# Patient Record
Sex: FEMALE | Race: WHITE
[De-identification: names, ages, dates, MRNs, and addresses within clinical notes are randomized per-mention and may not be internally consistent; named-entity substitution may affect disease eponyms.]

---

## 2017-08-31 ENCOUNTER — HOSPITAL ENCOUNTER (EMERGENCY)
Dept: HOSPITAL 46 - ED | Age: 37
Discharge: HOME | End: 2017-08-31
Payer: COMMERCIAL

## 2017-08-31 VITALS — WEIGHT: 242.99 LBS | HEIGHT: 65 IN | BODY MASS INDEX: 40.48 KG/M2

## 2017-08-31 DIAGNOSIS — Z98.51: ICD-10-CM

## 2017-08-31 DIAGNOSIS — Z79.899: ICD-10-CM

## 2017-08-31 DIAGNOSIS — R10.13: Primary | ICD-10-CM

## 2017-08-31 DIAGNOSIS — Z87.891: ICD-10-CM

## 2017-09-19 ENCOUNTER — HOSPITAL ENCOUNTER (OUTPATIENT)
Dept: HOSPITAL 46 - DS | Age: 37
LOS: 1 days | Discharge: HOME | End: 2017-09-20
Attending: SURGERY
Payer: COMMERCIAL

## 2017-09-19 VITALS — BODY MASS INDEX: 43.32 KG/M2 | HEIGHT: 65 IN | WEIGHT: 259.99 LBS

## 2017-09-19 DIAGNOSIS — Z98.890: ICD-10-CM

## 2017-09-19 DIAGNOSIS — F17.210: ICD-10-CM

## 2017-09-19 DIAGNOSIS — Z79.899: ICD-10-CM

## 2017-09-19 DIAGNOSIS — Z98.51: ICD-10-CM

## 2017-09-19 DIAGNOSIS — K81.1: Primary | ICD-10-CM

## 2017-09-19 DIAGNOSIS — E66.01: ICD-10-CM

## 2017-09-19 DIAGNOSIS — Z90.89: ICD-10-CM

## 2017-09-19 DIAGNOSIS — J45.909: ICD-10-CM

## 2017-09-19 PROCEDURE — BF12YZZ FLUOROSCOPY OF GALLBLADDER USING OTHER CONTRAST: ICD-10-PCS | Performed by: SURGERY

## 2017-09-19 PROCEDURE — G0008 ADMIN INFLUENZA VIRUS VAC: HCPCS

## 2017-09-19 PROCEDURE — 0FT44ZZ RESECTION OF GALLBLADDER, PERCUTANEOUS ENDOSCOPIC APPROACH: ICD-10-PCS | Performed by: SURGERY

## 2017-09-19 NOTE — NUR
37 YR OLD FEMALE PATIENT OF DR. CARTER ADMITTED TO [facility] FROM DAYSURGERY WITH DX
OF LAP CHOLEY/SEVERE POST OP N/V. UPON ADMIT TO MED-SURG IS ALET ORIENTED. C/O
PAIN AND NAUSEA. SCOPE SITES X 4 W/O DRAINAGE. FEW STERI-SRIPS ON.

## 2017-09-19 NOTE — NUR
PT RESTING ON LEFT SIDE.  AWAKENS EASILY.  STATES "IF I MOVE I WILL THROW UP"
RATES PAIN A 9/10 IN ABD AND STATES "I DONT WANT TO MOVE."

## 2017-09-19 NOTE — NUR
PATIENT INCONTINANT OF URINE WHILE VOMITTING. PATIENT UP TO [facility] [place], [place] TO VOID 500
MLS. CHANGED GOWN AND BED LINENS. PATIENT BACK TO BED. MEDICATED WITH 0.5 MG
IV DILAUDID FOR 9/10 ABDOMINAL PAIN. PATIENT CONTINUES TO BE NAUSEATED.

## 2017-09-19 NOTE — NUR
TALKED TO DR CARTER REGARDING ANTIEMETIC MEDICATION OPTIONS, NEW ORDERS
RECEIVED FOR x1 DOSE IV HYDROCORTISONE.

## 2017-09-19 NOTE — NUR
TALKED WITH LAURYN CANNON, ANESTHESIA WHO ORDERS 25 MG IV BENADRYL AND A
SCOPALAMINE PATCH. PATIENT CONTINUES TO VOMIT SMALL AMOUNTS. PATIENT RATES
PAIN 9/10. PATIENT AGAIN INCONTINANT OF URINE. PLACED CLEAN PAD UNDER PATIENT.

## 2017-09-19 NOTE — NUR
REPOSITIONED PATIENT TO LEFT SIDE TO HELP WITH PAIN. PATIENT RATES PAIN 9/10
TO RUQ, STILL C/O NAUSEA HOWEVER PATIENT IS NOT VOMITTING AT THIS TIME.

## 2017-09-19 NOTE — NUR
1325 REPORT FROM PACU RN. THIS RN ASSUMES CARE. PATIENT LYING IN BED MOANING
WITH EYES CLOSED. PATIENT AROUSES EASILY TO VOICE OR TOUCH. SCOPE SITES INTACT
XS 4. UMBILICAL SITE HAS SANGENEOUS DRAINAGE. WHICH THIS RN REINFORCES WITH
ABDIRAHMAN.
ORIENTED PATIENT TO ROOM AND CALL LIGHT. VITAL SIGNS TAKEN. PATIENT SLEEPS.

## 2017-09-19 NOTE — NUR
PT ASSESSMENT COMPLETE. PT RESTING IN BED. PT DENIES ANY N/V, SOB AT THIS
TIME. PT RATES PAIN 7/10, DENIES NEED FOR PRN PAIN MEDICATION AT THIS TIME. PT
STATES SHE IS "ABLE TO EAT SOME JELLO WITHOUT GETTING NAUSEOUS". IV FLUIDS
INFUSING WITHOUT DIFFICULTY. ABDOMINAL INCISION SITES x3 ARE OPEN TO AIR,
SCANT AMOUNT OF DRAINAGE NOTED, WELL APPROXIMATED. x1 SITE HAS STERI STRIPS
WITH DRIED DRAINAGE NOTED, STERI STRIPS BARELY INTACT WITH SKIN. EDUCATED PT
ON ALLOWING STERI STRIPS TO FALL OFF ON THEIR OWN AND NOT TO PULL THEM OFF THE
WOUND, PT VERBALIZED UNDERSTANDING. PT C/O BEING HUNGRY, WATER AND JELLO
GIVEN. CALL LIGHT WITHIN REACH. PT DENIES ANY FURTHER NEEDS AT THIS TIME.

## 2017-09-19 NOTE — NUR
Patient does not meet criteria for discharge. Dr Powers called and updated.
Patient to be admitted observation for post op nausea/vomitting.
Called and left voice mail with patients ,

## 2017-09-19 NOTE — NUR
MD CALLED AS PATIENT IS VOMITTING AND PAINFUL. NEW ORDERS RECIEVED. MEDICATED
PATIENT WITH 12.5 MG IV PHENERGAN.

## 2017-09-20 NOTE — NUR
patient agreeable to ambulate in a little while.  patient ate 50% of her
breakfast and tolerated it without any trouble.

## 2017-09-20 NOTE — NUR
PT HAD AN UNEVENTFUL NIGHT, SLEPT MOST OF NIGHT. PT AMBULATES INDEPENDENTLY IN
ROOM. PT DID NOT RECEIVE ANY PRN PAIN MEDICATION THIS SHIFT. PT DENIED ANY
N/V THIS SHIFT, TOLERATING CLEAR LIQUID DIET WELL. PT IV INFILTRATED, NO IV
ACCESS AT THIS TIME. ABDOMINAL LAP SITES x4 INTACT, WELL APPROXIMATED, OPEN TO
AIR. PT VOIDING WITHOUT DIFFICULTY. PT ON ROOM AIR. PT PLAN IS TO D/C HOME
TODAY.

## 2017-09-20 NOTE — NUR
PATIENT SITTING UP IN BED SLOWLY EATING BREAKFAST. HANDS AND FACE WASHED.
PATIENT REFUSED TO SIT UP TO CHAIR. WOULD LIKE TO BRUSH TEETH AFTER BREAKFAST.
CALL BUTTON IN REACH. NO OTHER NEEDS AT THIS TIME.

## 2017-09-20 NOTE — NUR
PATIENT SITTING UP TO CHAIR BATH AND ORAL CARE DONE. LINENS CHANGED. FRESH ICE
WATER GIVEN. CALL BUTTON IN REACH. NO OTHER NEEDS AT THIS TIME.

## 2017-09-20 NOTE — NUR
PT LAYING IN BED, GREETED ME. SHE SAID SHE IS FEELING MUCH BETTER AND THOUGHT
SHE WAS GOING HOME TODAY. PT REQUESTED PRAYER AND THANKED ME FOR THE VISIT

## 2017-09-20 NOTE — NUR
PATIENT REMAINS UP IN THE CHAIR, PATIENT ATE 90% OF HER LUNCH AND STILL HAS NO
C/O NAUSEA.  PATIENT IS NOT PAINFUL AND DOING WELL.

## 2017-09-20 NOTE — NUR
PT SLEEPING, RR EVEN AND UNLABORED. PT APPEARS COMFORTABLE AT THIS TIME. IV
FLUIDS INFUSING WITHOUT DIFFICULTY. CALL LIGHT WITHIN REACH.

## 2017-09-20 NOTE — NUR
report received from Rozina ALARCON at this time, abdominal pain is at a 6/10
patient given 1 percocet po.

## 2017-09-20 NOTE — NUR
CALLED DR CARTER REGARDING IV AND CHANGE IN DIET PER PT REQUEST. RECEIVED NEW
ORDERS FOR OKAY TO LEAVE IV OUT AND ADVANCE TO REGULAR DIET AS TOLERATED.

## 2017-09-20 NOTE — NUR
PT UP AMBULATING IN ROOM INDEPENDENTLY, VOIDING WITHOUT DIFFICULTY. PT DENIES
ANY N/V, TOLERATING CLEAR LIQUID DIET WELL, C/O WANTING ADVANCED DIET.
EDUCATED PT ON MD ORDERS AND NOT BEING ABLE TO ADVANCE DIET UNTIL MD IS CALLED
IN THE AM, PT NOT HAPPY BUT VERBALIZES UNDERSTANDING. JELLO GIVEN. PT IV
INFILTRATED, IV DC'D WITH CATHETER TIP INTACT, PRESSURE DRESSING APPLIED. LEFT
ARM WITH IV SITE WRAPPED IN WARM BLANKETS. PT DENIES ANY NEED FOR PAIN
MEDICATION. CALL LIGHT WITHIN REACH. PT DENIES ANY FURTHER NEEDS AT THIS TIME.

## 2017-09-20 NOTE — NUR
PATIENT SITTING UP IN THE CHAIR, ORAL CARE DONE BY THE CNA AT THIS TIME, APPLE
JUICE GIVEN TO THE PATIENT AT THIS TIME AND LUNCH ORDERED.

## 2017-09-20 NOTE — NUR
CONSENT SIGNED FOR FLU VACCINE AT THIS TIME, PATIENT'S PAIN IS STILL AT A
6/10.  PATIENT DENIES ANY NAUSEA AND VOMITING AND TOLERATED A REGULAR
BREAKFAST WELL.  ABDOMINAL SCOPE SITES ARE ALL INTACT WITHOUT ANY DRESSINGS.
PATIENT DENIES PASSING GAS OR BURPING AT THIS TIME.

## 2017-09-23 NOTE — OR
[facility]
                                    [address], [place]  [address]
_________________________________________________________________________________________
                                                                 Signed   
 
 
DATE OF PROCEDURE:  09/19/17 
 
PREOPERATIVE DIAGNOSES 
Chronic acalculous cholecystitis (0 ejection fraction CCK-HIDA test). 
Morbid obesity.
 
POSTOPERATIVE DIAGNOSES 
Chronic acalculous cholecystitis (0 ejection fraction CCK-HIDA test). 
Morbid obesity.
Gallbladder with profound cholesterolosis and chronic inflammation.
 
PROCEDURE 
Laparoscopic cholecystectomy with intraoperative cholangiogram.
Surgeon directed fluoroscopy.
 
SURGEON:  Magaly Carter MD.
 
ANESTHESIA 
General endotracheal (Kelley Pierre CRNA) and local 20 mL of 0.25% Marcaine with
epinephrine local.
 
INDICATION 
This morbidly obese white woman is a patient of Dr. Surinder Benitez and known to me from
the past having undergone total thyroidectomy. She additionally has eosinophilic
esophagitis and reflux disease. She has been having increasing symptoms of right upper
abdominal pain highly suggestive of biliary colic and chronic cholecystitis. A
gallbladder ultrasound was found to be normal. A CCK-HIDA test was recently performed
showing essentially no ejection fraction from the gallbladder and with reproduction of
her right subcostal and right posterior thoracic pain problems. She is admitted at this
time to undergo cholecystectomy on the probability this represents acalculous
cholecystitis. The risks of bleeding, infection, bile leak, need for open procedure, and
of course, failure to cure her symptoms were all reviewed in detail. She understands
wished to proceed.
 
FINDINGS 
She did have a somewhat fatty liver concordant to her overall body habitus. Gallbladder
itself was chronically inflamed. The cystic duct was relatively long. The gallbladder
once opened showed profound cholesterolosis and chronic inflammation. The cholangiogram
was normal. There were no other findings of concern.
 
DESCRIPTION OF PROCEDURE 
The patient was brought to the operating room, given a general endotracheal anesthetic.
 
    Electronically Signed By: MAGALY CARTER MD  09/23/17 1036
_________________________________________________________________________________________
PATIENT NAME:     JONN CHAPPELL                     
MEDICAL RECORD #: [id number]                     OPERATIVE REPORT              
          ACCT #: [id number]  
DATE OF BIRTH:   [birth year]                                       
PHYSICIAN:   MAGALY CARTER MD                      REPORT #: [id number]
REPORT IS CONFIDENTIAL AND NOT TO BE RELEASED WITHOUT AUTHORIZATION
 
 
                                  [facility]
                                    [address], [place]  [address]
_________________________________________________________________________________________
                                                                 Signed   
 
 
Preoperative antibiotic Ancef was given and sequential compression device stockings and
heparin subcutaneously administered was undertaken. The abdomen was prepared with a
Chlorhexidine solution and draped sterilely. Despite her large abdominal wall pannus, an
infraumbilical incision was made, and using an open Alondra cannula technique,
pneumoperitoneum was achieved to a level of 14 mmHg of carbon dioxide gas.
Intraabdominal inspection showed no sign of ascites or carcinomatosis. There were some
omental adhesions in the region of the umbilicus. The gallbladder was found to have a
fatty blunted liver edge. Gallbladder was chronically inflamed. Three additional trocars
were placed in usual configuration in the subxiphoid, right midclavicular, and right
anterior axillary line. The gallbladder was elevated cephalad and retracted laterally
and using blunt electrocautery dissection, the triangle of Calot was dissected free. A
dominant cystic arterial branch was doubly clipped and later divided. A clip was applied
across gallbladder cystic duct junction and a transverse choledochotomy made in the
cystic duct. Using an Ibarra type cholangiocatheter, intraoperative cholangiography was
undertaken showing free flow of contrast in the biliary tree with prompt emptying into
the duodenum. Retrograde flow to the proximal hepatic duct was normal. The cystic duct
was relatively long. The cystic duct was triply clipped after removal of the catheter
and the gallbladder dissected free in a retrograde fashion using electrocautery.
Gallbladder was extracted through the epigastric port without problem. Opened on the
back table and found to have profound cholesterolosis and chronic inflammation.
Reinspection of subhepatic space showed no sign of bile leak, bleeding or other
problems. Excess irrigation fluid was suctioned free. The trocars were removed under
direct visualization showing no sign of bleeding. The infraumbilical fascial incision
was carefully reapproximated with interrupted 0 Vicryl suture. A 20 mL 0.25% Marcaine
with epinephrine was injected locally. The skin was closed with interrupted 3-0 Vicryl.
Steri -Strips were applied. It is anticipated that the patient will be extubated in the
operating room, to be transferred to the recovery room in good condition.
 
BLOOD LOSS:  Minimal.
 
_____________________________
MD JUSTINE Beckford/Odilon
DD: 09/19/2017 11:58:06
DT: 09/19/2017 12:44:38
Job #: [id number]
 
cc:  Art Benitez MD
 
 
 
 
    Electronically Signed By: MAGALY CARTER MD  09/23/17 1036
_________________________________________________________________________________________
PATIENT NAME:     JONN CHAPPELL                     
MEDICAL RECORD #: [id number]                     OPERATIVE REPORT              
          ACCT #: [id number]  
DATE OF BIRTH:   [birth year]                                       
PHYSICIAN:   MAGALY CARTER MD                      REPORT #: [id number]
REPORT IS CONFIDENTIAL AND NOT TO BE RELEASED WITHOUT AUTHORIZATION

## 2017-10-07 ENCOUNTER — HOSPITAL ENCOUNTER (EMERGENCY)
Dept: HOSPITAL 46 - ED | Age: 37
LOS: 1 days | Discharge: HOME | End: 2017-10-08
Payer: COMMERCIAL

## 2017-10-07 VITALS — BODY MASS INDEX: 43.32 KG/M2 | HEIGHT: 65 IN | WEIGHT: 260 LBS

## 2017-10-07 DIAGNOSIS — L76.34: Primary | ICD-10-CM

## 2017-10-07 DIAGNOSIS — Z87.891: ICD-10-CM

## 2017-10-07 DIAGNOSIS — Z79.899: ICD-10-CM

## 2017-10-07 DIAGNOSIS — Z98.51: ICD-10-CM

## 2018-06-19 ENCOUNTER — HOSPITAL ENCOUNTER (EMERGENCY)
Dept: HOSPITAL 46 - ED | Age: 38
Discharge: HOME | End: 2018-06-19
Payer: COMMERCIAL

## 2018-06-19 VITALS — BODY MASS INDEX: 43.32 KG/M2 | HEIGHT: 65 IN | WEIGHT: 259.99 LBS

## 2018-06-19 DIAGNOSIS — Z79.899: ICD-10-CM

## 2018-06-19 DIAGNOSIS — Z98.890: ICD-10-CM

## 2018-06-19 DIAGNOSIS — L03.115: ICD-10-CM

## 2018-06-19 DIAGNOSIS — T81.4XXA: Primary | ICD-10-CM

## 2018-06-20 ENCOUNTER — HOSPITAL ENCOUNTER (EMERGENCY)
Dept: HOSPITAL 46 - ED | Age: 38
Discharge: HOME | End: 2018-06-20
Payer: COMMERCIAL

## 2018-06-20 DIAGNOSIS — Z79.899: ICD-10-CM

## 2018-06-20 DIAGNOSIS — L03.115: Primary | ICD-10-CM

## 2018-06-20 DIAGNOSIS — Z79.2: ICD-10-CM

## 2018-07-11 ENCOUNTER — HOSPITAL ENCOUNTER (EMERGENCY)
Dept: HOSPITAL 46 - ED | Age: 38
Discharge: HOME | End: 2018-07-11
Payer: COMMERCIAL

## 2018-07-11 VITALS — BODY MASS INDEX: 43.32 KG/M2 | WEIGHT: 259.99 LBS | HEIGHT: 65 IN

## 2018-07-11 DIAGNOSIS — Z79.899: ICD-10-CM

## 2018-07-11 DIAGNOSIS — L03.115: Primary | ICD-10-CM

## 2019-06-06 ENCOUNTER — HOSPITAL ENCOUNTER (EMERGENCY)
Dept: HOSPITAL 46 - ED | Age: 39
Discharge: HOME | End: 2019-06-06
Payer: COMMERCIAL

## 2019-06-06 VITALS — WEIGHT: 273 LBS | BODY MASS INDEX: 45.48 KG/M2 | HEIGHT: 65 IN

## 2019-06-06 DIAGNOSIS — Z90.89: ICD-10-CM

## 2019-06-06 DIAGNOSIS — Z79.899: ICD-10-CM

## 2019-06-06 DIAGNOSIS — M94.0: Primary | ICD-10-CM

## 2019-06-06 DIAGNOSIS — Z87.891: ICD-10-CM

## 2019-06-06 DIAGNOSIS — E89.0: ICD-10-CM

## 2019-06-06 DIAGNOSIS — J40: ICD-10-CM

## 2019-06-08 NOTE — EKG
[facility]
                                    [address] [facility]
                                  [place]  [address]
_________________________________________________________________________________________
                                                                 Signed   
 
 
Sinus rhythm with premature ventricular complexes or fusion complexes
Low voltage QRS
Cannot rule out Inferior infarct , age undetermined
Cannot rule out Anterior infarct , age undetermined
Abnormal ECG
No previous ECGs available
Confirmed by GUILLE UMANZOR MD ([id number]) on 6/8/2019 9:31:41 PM
 
 
 
 
 
 
 
 
 
 
 
 
 
 
 
 
 
 
 
 
 
 
 
 
 
 
 
 
 
 
 
 
 
 
 
 
 
 
    Electronically Signed By: GUILLE UMANZOR MD  06/08/19 2131
_________________________________________________________________________________________
PATIENT NAME:     JONN CHAPPELL ROSE                     
MEDICAL RECORD #: [id number]                     Electrocardiogram             
          ACCT #: [id number]  
DATE OF BIRTH:   [birth year]                                       
PHYSICIAN:   GUILLE UMANZOR MD           REPORT #: [id number]
REPORT IS CONFIDENTIAL AND NOT TO BE RELEASED WITHOUT AUTHORIZATION

## 2019-07-01 ENCOUNTER — HOSPITAL ENCOUNTER (EMERGENCY)
Dept: HOSPITAL 46 - ED | Age: 39
LOS: 1 days | Discharge: HOME | End: 2019-07-02
Payer: COMMERCIAL

## 2019-07-01 VITALS — HEIGHT: 65 IN | BODY MASS INDEX: 28.66 KG/M2 | WEIGHT: 172 LBS

## 2019-07-01 DIAGNOSIS — H10.89: Primary | ICD-10-CM

## 2019-07-01 DIAGNOSIS — Z90.89: ICD-10-CM

## 2019-07-01 DIAGNOSIS — Z79.899: ICD-10-CM

## 2019-07-01 DIAGNOSIS — Z87.891: ICD-10-CM

## 2019-07-01 NOTE — XMS
PreManage Notification: JONN CHAPPELL MRN:[id number]
 
Security Information
 
Security Events
No recent Security Events currently on file
 
 
 
CRITERIA MET
------------
- [facility] - 2 Visits in 30 Days
 
 
CARE PROVIDERS
-------------------------------------------------------------------------------------
ERNA SAMS     Nurse Practitioner: Family     Current
 
PHONE: [phone]
-------------------------------------------------------------------------------------
Don Zhang     Current
MD
 
PHONE: Unknown
-------------------------------------------------------------------------------------
Giulia Medina     Current
 
PHONE: Unknown
-------------------------------------------------------------------------------------
 
[facility] has no Care Guidelines for this patient.
 
EVA VISIT COUNT (12 MO.)
-------------------------------------------------------------------------------------
3 [facility]
-------------------------------------------------------------------------------------
TOTAL 3
-------------------------------------------------------------------------------------
NOTE: Visits indicate total known visits.
 
ED/UCC VISIT TRACKING (12 MO.)
-------------------------------------------------------------------------------------
07/01/2019 22:32
[facility] OR
 
TYPE: Emergency
 
COMPLAINT:
- RIGHT EYE INJURY
-------------------------------------------------------------------------------------
06/06/2019 19:21
[facility] [place]
 
TYPE: Emergency
 
COMPLAINT:
- CHEST PAIN/SOB
 
DIAGNOSES:
 
- Cough
- Personal history of nicotine dependence
- Acquired absence of other organs
- Bronchitis, not specified as acute or chronic
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
- Other long term (current) drug therapy
- Postprocedural hypothyroidism
-------------------------------------------------------------------------------------
07/11/2018 21:27
[facility] [place]
 
TYPE: Emergency
 
COMPLAINT:
- POSS INFECTION
 
DIAGNOSES:
- Cellulitis of right lower limb
- Other long term (current) drug therapy
-------------------------------------------------------------------------------------
 
 
INPATIENT VISIT TRACKING (12 MO.)
No inpatient visits to display in this time frame
 
[email or web address]

## 2019-12-12 NOTE — OR
[facility]
                                    [address], [place]  [address]
_________________________________________________________________________________________
                                                                 Signed   
 
 
DATE OF OPERATION:
2019
 
SURGEON:
Tee Wan MD
 
PREOPERATIVE DIAGNOSES:
1. Change in bowel habits with diarrhea for one year.
2. Father  of colon cancer at age 52.
3. Paternal grandmother  of colon cancer at age 62.
 
POSTOPERATIVE DIAGNOSIS:
Minimal internal hemorrhoids.
 
PROCEDURE:
Colonoscopy with random cold biopsies.
 
ESTIMATED BLOOD LOSS:
None.
 
INDICATIONS:
Jonn is a 39-year-old female, asked to see me for a colonoscopy.  She had a change in
bowel habits with diarrhea over the last year.  She is not sure really what makes it
better or worse.  She said it came along before her total thyroidectomy.  She also
explained that her father  at age 52 of colon cancer.  Her paternal grandmother 
at age 62 of colon cancer.  There is no inflammatory bowel disease in the family.  A CT
scan of abdomen and pelvis showed a small umbilical hernia, maybe from her previous
gallbladder surgery.  However, there were no other issues particularly with the small or
large bowel.  I was asked to see her for colonoscopy and random biopsies.  I met with
Jonn in the office and I gave her a pamphlet on colonoscopy.  We reviewed the nature
of the test along with the risks including, but not limited to gas, bloating, crampy
abdominal pain, bleeding, perforation requiring surgery, and missed diagnosis.  She also
understands the need for IV conscious sedation.  She had expressed understanding and
wished to proceed. 
 
PROCEDURE NOTE:
Jonn was taken into our endoscopy suite and placed in the left lateral decubitus
position.  She was given IV sedation with 7 mg of Versed and 125 mcg of fentanyl.  A
digital rectal exam was performed and this was unremarkable.  She had no external
hemorrhoids and good sphincter tone.  The adult colonoscope was introduced and advanced
all around into the cecum under direct visualization of camera without difficulty.  Her
prep was quite excellent.  We could easily see the appendiceal orifice and the ileocecal
 
    Electronically Signed By: TEE WAN MD  19 0958
_________________________________________________________________________________________
PATIENT NAME:     JONN CHAPPELL ROSE                     
MEDICAL RECORD #: [id number]            OPERATIVE REPORT              
          ACCT #: [id number]  
DATE OF BIRTH:   [birth year]            REPORT #: [id number]      
PHYSICIAN:        TEE WAN MD             
PCP:              GILMAR EL MD           
REPORT IS CONFIDENTIAL AND NOT TO BE RELEASED WITHOUT AUTHORIZATION
 
 
                                  [facility]
                                    [address], [place]  [address]
_________________________________________________________________________________________
                                                                 Signed   
 
 
valve.  We made several attempts to pass the scope into the terminal ileum without
success.  The scope was then slowly withdrawn.  We saw no evidence of any inflammatory
changes throughout her entire colon.  We went ahead and took several random biopsies
throughout the colon and rectum.  Once in the rectum, the scope had been retroflexed and
she has very minimal internal hemorrhoid columns.  After this, the gas was suctioned out
and colonoscope removed.  Jonn tolerated procedure quite well. 
 
RECOMMENDATIONS:
Jonn will follow up in my office in 7 to 14 days to review her results.
 
 
 
            ________________________________________
            Tee Wan MD 
 
 
ALB/ALEAHL
Job #:  [id number]
DD:  2019 09:27:34
DT:  2019 09:46:39
 
cc:            MD Gilmar Newby MD
 
 
Copies:  TEE WAN MD, ROBERT D DMD
~
 
 
 
 
 
 
 
 
 
 
 
 
 
 
    Electronically Signed By: TEE WAN MD  19 0958
_________________________________________________________________________________________
PATIENT NAME:     JONN CHAPPELL [facility]                     
MEDICAL RECORD #: [id number]            OPERATIVE REPORT              
          ACCT #: [id number]  
DATE OF BIRTH:   [birth year]            REPORT #: [id number]      
PHYSICIAN:        TEE WAN MD             
PCP:              GILMAR EL MD           
REPORT IS CONFIDENTIAL AND NOT TO BE RELEASED WITHOUT AUTHORIZATION

## 2019-12-12 NOTE — NUR
12/12/19 0936 Darrell,Karyn
0958 PT ARRIVED TO PACU WAKES TO VERBAL STIMULI AND DENIES PAIN AND
NAUSEA. VSS. PT ENCOURAGED TO PASS GAS. RESP EVEN AND UNLABORED.
 
0929 O2 REMOVED, PT ITCHING NOSE. PLAN OF CARE DISCUSSED. PT ASLEEP
OFF AND ON.

## 2019-12-13 NOTE — PATH
[facility]
                                    [address] [facility] [place]  [address]
_________________________________________________________________________________________
                                                                 Signed   
 
 
 
SPECIMEN(S): A RANDOM COLON
 
SPECIMEN SOURCE:
A. RANDOM COLON
 
CLINICAL HISTORY:
Diarrhea, family history of colon CA. Post: Min int hemorrhoids.
MICROSCOPIC DESCRIPTION:
Histologic sections of all submitted blocks are examined by light microscopy. 
These findings, together with the gross examination, support the pathologic 
diagnosis. 
 
FINAL PATHOLOGIC DIAGNOSIS:
Colon, random, biopsy:
-  Fragments of colonic mucosa with no histopathologic abnormality.
-  Negative for dysplasia or malignancy.
 
COMMENT:
Sections demonstrate fragments of colonic mucosa with no activity including 
cryptitis and crypt abscess formation, and no signs of chronicity including 
basal lymphoplasmacytosis or crypt architectural 
distortion.  There are no granulomata, viral cytopathic changes, subepithelial 
collagen band thickening or increased intraepithelial lymphocytes.  No 
infectious organisms are seen on HE stain. 
Clinical correlation required.
NAL:cml:C2NR
 
GROSS DESCRIPTION:
The specimen, labeled "JE, random colon biopsy," is received in formalin and 
consists of four, 0.2-0.3 cm tan-pink tissue fragments. Specimen is entirely 
submitted in cassette (A1). 
AM (under the direct supervision of a pathologist)
The Gross Description was prepared using a voice recognition system.  The 
report was reviewed for accuracy; however, sound-alike word errors, addition 
and/or deletions may occur.  If there is any 
question about this report, please contact Client Services.
 
PERFORMING LABORATORY:
The technical component was performed by [facility], [address] [place] [id number] (Medical Director: Bonnie Bryan MD; CLIA# [id number]). 
Professional interpretation was performed by 
 
                                                                                    
_________________________________________________________________________________________
PATIENT NAME:     JONN CHAPPELL ROSE                     
MEDICAL RECORD #: [id number]            PATHOLOGY                     
          ACCT #: [id number]       ACCESSION #: [id number]     
DATE OF BIRTH:   [birth year]            REPORT #: [id number]       
PHYSICIAN:        [facility]              
PCP:              GILMAR EL MD           
REPORT IS CONFIDENTIAL AND NOT TO BE RELEASED WITHOUT AUTHORIZATION
 
 
                                  [facility]
                                    [address], [place]  [address]
_________________________________________________________________________________________
                                                                 Signed   
 
 
[facility], [facility], [address] [address] (Medical Director:  Darío Zhang MD; CLIA# 
[id number]). 
 
Diagnostician:  Carina He MD
Pathologist
Electronically Signed 12/13/2019
 
 
Copies:                                
~
 
 
 
 
 
 
 
 
 
 
 
 
 
 
 
 
 
 
 
 
 
 
 
 
 
 
 
 
 
 
 
 
                                                                                    
_________________________________________________________________________________________
PATIENT NAME:     JONN CHAPPELL ROSE                     
MEDICAL RECORD #: [id number]            PATHOLOGY                     
          ACCT #: [id number]       ACCESSION #: [id number]     
DATE OF BIRTH:   [birth year]            REPORT #: [id number]       
PHYSICIAN:        [facility]              
PCP:              GILMAR EL MD           
REPORT IS CONFIDENTIAL AND NOT TO BE RELEASED WITHOUT AUTHORIZATION

## 2021-05-27 NOTE — EKG
[facility]
                                    [address] [facility]
                                  [place]  [address]
_________________________________________________________________________________________
                                                                 Signed   
 
 
Normal sinus rhythm
Cannot rule out Anterior infarct (cited on or before 06-JUN-2019)
Abnormal ECG
When compared with ECG of 06-JUN-2019 19:27,
fusion complexes are no longer present
premature ventricular complexes are no longer present
Minimal criteria for Inferior infarct are no longer present
Nonspecific T wave abnormality, improved in Anterolateral leads
Confirmed by ZACKERY ZHANG DO ([id number]) on 5/27/2021 12:35:28 PM
 
 
 
 
 
 
 
 
 
 
 
 
 
 
 
 
 
 
 
 
 
 
 
 
 
 
 
 
 
 
 
 
 
 
 
 
    Electronically Signed By: ZACKERY ZHANG DO  05/27/21 1235
_________________________________________________________________________________________
PATIENT NAME:     JONN CHAPPELL ROSE                     
MEDICAL RECORD #: [id number]                     Electrocardiogram             
          ACCT #: [id number]  
DATE OF BIRTH:   [birth year]                                       
PHYSICIAN:   ZACKERY ZHANG DO                     REPORT #: [id number]
REPORT IS CONFIDENTIAL AND NOT TO BE RELEASED WITHOUT AUTHORIZATION

## 2021-06-10 NOTE — NUR
1330 PT COMPLAINS OF PAIN 8/10 TRYING TO GET NAUSEA UNDER CONTROL BEFORE I CAN
GIVE PAIN MEDS SHE TOOK A SIP OF WATER AND REPORTS SHE IS UNABLE TO SWALLOW IT
BECAUSE SHE FEELS LIKE SHE WILL THROW UP, HE  IS AT BEDSIDE. REGLAN WAS
GIVEN FOR NAUSEA.

## 2021-06-10 NOTE — NUR
06/10/21 1052 Darrell,Karyn
1040 PT ARRIVED TO PACU ON 10L, VSS. O2 DECREASED TO 6L VIA MASK. JAW
THRUST USED OFF AND ON TO MAINTAIN AIRWAY. ORAL AIRWAY IN PLACE.
 
1047 PT STARTED MOVING AND DEEP BREATHING NOTED. ORAL AIRWAY REMIANS
IN PLACE.
 
1051 PT CONTINUES TO WAKE OFF AND ON, REACTIVE TO TACTILE STIMULI.

## 2021-06-10 NOTE — NUR
1230 PT BACK TO ROOM FROM PACU AWAKE ALERT, COMPLAINS OF PAIN BUT IS UNABLE TO
RATE PAIN, SHE VOMITED IN PACU SO I WANT TO GIVE SOME TIME BEFORE I GIVE HER
ORAL PAIN MEDS. SHE DENIES NAUSEA AT THIS TIME.I MADE A WARM PACK TO PUT ON
HER ABD, SHE REPORTS IT FEELS GOOD. FAIRCHILD CATH IN PLACE AND DRAINING WELL.

## 2021-06-10 NOTE — NUR
1715 CALLED DR GARCÍA GAVE REPORT ABOUT INS AND OUTS HE ADVISED US TO KEEP
LONGER UNTIL SHE CAN VOID MORE THAN 100ML. SHE WILL BE TAKEN TO MS FLOOR.

## 2021-06-10 NOTE — NUR
1445 150ML OF IV FLUID GIVEN IN DAYSURGERY WITH 200ML OF URINE IN FAIRCHILD BAG
SINCE SHE ARRIVED TO DAYSURGERY.

## 2021-06-10 NOTE — NUR
PT. ARRIVED VIA STRETCHER WITH RN. REPORT RECEIVED AT BEDSIDE. PT. IS ALERT
AND ORIENTED. SHE DENIES NAUSEA AND STATES
ABD. PAIN IS TOLERABLE. ORDERED SOUP FOR
DINNER. ABD. LAP. SITE DRESSINGS ARE CDI. LARGE AREA OF UNEVEN REDNESS ON
LOWER ABDOMEN. PT. DENIES THAT IT IS PAINFUL OR ITCHING. WILL CONTINUE TO
MONITOR. PT. IS AFEBRILE BUT REPORTS BEING DIAPHORETIC.  SHE WAS GIVEN A FAN.
SHE DOES NOT WANT TO GET UP TO VOID AT THIS TIME AND THIS NURSE TOLD HER SHE
SHOULD GET UP TO THE TOILET WITHIN HALF HOUR AND SHE IS AGREEABLE. LEFT
RESTING IN BED WITH CALL LIGHT IN REACH.

## 2021-06-10 NOTE — NUR
1515 PT REPORTS NAUSEA IS MUCH BETTER, SHE IS EATING CRACKERS AND APPLE SAUCE
TOLERATES WELL. SHE REPORTS PAIN IS 7/10 AND REQUEST PAIN MEDS.

## 2021-06-10 NOTE — NUR
PT. AMBULATED INDENDENTLY TO THE BATHROOM BUT COULD NOT VOID. BLADDER SCANNED
AND 45ML IN BLADDER. PT. REPORTS RUQ PAIN THAT IS SHARP AT TIMES IN RIBS. ABD.
SOFT AND BOWEL TONES ACTIVE. WILL CONTINUE TO MONITOR.

## 2021-06-10 NOTE — NUR
1450 PT REPORTS NAUSEA IS BETTER BUT SHE IS VERY TIRED, HER  LEFT FOR A
WHILE TO CHECK ON THEIR KIDS, PT IS RESTING COMFORTABLEY NOW.

## 2021-06-10 NOTE — NUR
DR GARCÍA CALLED AND GIVEN UPDATE ON RECENT 200MLS VOID, PER DR GARCÍA,
OKAY TO CONTINUE WITH DISCHARGE. pt ALREADY GIVEN WRITTEN AND VERBALIZED
DISCHARGE INSTRUCTIONS IN DAYSURGERY, THIS RN REVIEWED WRITTEN DISCHARGE
INSTRUCTIONS WITH pt. pt VERBALIZED UNDERSTANDING AND DENIES ADDITIONAL
QUESTIONS. PRESCRIPTIONS PICKED UP BY , VSS. LAP SITES WNL, STERI
STRIPS WITH BANDAIDS IN PLACE. SCANT SEROSANGUINEOUS SHADOWING TO STERI
STRIPS. FAINT
REDDNESS NOTED NEAR/AROUND PANNUS AREA. pt
DENIES NAUSEA, CHEST PAIN, SOB. REPORTS TOLERABLE 5/10 PAIN, pt AWARE OF LAST
TIME
PAIN MEDICATION WAS GIVEN AND WRITTEN ON DISCHARGE INSTRUCTIONS EARLIER BY
DAYSURGERY. pt ESCORTED VIA WHEELCHAIR BY CNA SAMANTHA [facility] , TO BE DISCHARGED HOME.

## 2021-06-10 NOTE — NUR
1255 CHECKED IN ON PT SHE IS SLEEPING COMPFORTABLY, O2 MONITOR LEFT ON, HER
SATS HAVE BEEN  ON ROOM AIR.

## 2021-06-10 NOTE — NUR
SHIFT REPORT RECEIVED FROM DAYSHIFT AGNES ADAM AT BEDSIDE, pt AWAKE AND RESTING IN
BED. REPORTS RECENTLY VOIDING, 200MLS OUTPUT NOTED. pt DENIES NEEDS, CALL
LIGHT IN REACH. BOARD UPDATED.

## 2021-06-10 NOTE — NUR
1400 SPOKE WITH MAGALY HILL FOR INAPSINE ORDER AS PT IS STILL COMPLAINING OF
NAUSEA. 1.25MG OF INAPSINE GIVEN

## 2021-06-11 NOTE — PATH
[facility]
                                    [address] [place], [place]  [address]
_________________________________________________________________________________________
                                                                 Signed   
 
 
 
SPECIMEN(S): A UTERUS, CERVIX, AND TUBES
 
SPECIMEN SOURCE:
A. UTERUS, CERVIX, AND TUBES
 
CLINICAL HISTORY:
Abnormal uterine bleeding.  TLH, BS, cysto.
 
FINAL PATHOLOGIC DIAGNOSIS:
Uterus, cervix, and bilateral fallopian tubes, hysterectomy and bilateral 
salpingectomy: 
-  Cervix:  No histopathologic abnormality.
-  Endometrium:  Weakly proliferative endometrium.
-  Myometrium:  Adenomyosis.
-  Serosa:  No histopathologic abnormality.
-  Fallopian tubes:  No histopathologic abnormality.
-  No evidence of malignancy.
NAL:cml:C2NR
 
MICROSCOPIC EXAMINATION:
Histologic sections of all submitted blocks are examined by light microscopy.  
These findings, together with the gross examination, support the pathologic 
diagnosis. 
 
GROSS DESCRIPTION:
The specimen, labeled "JE," and designated on the requisition "bilateral tubes, 
uterus and cervix," is received in formalin and consists of a uterus (117 gram, 
6.6 cm left to right, 5.2 cm superior to 
inferior, 4.7 cm anterior to posterior), attached cervix (3.4 cm in length x 
3.0 cm in diameter) with pink-tan, smooth cervical mucosa and patent 
slit-shaped os (1.3 x 0.4 cm), and two detached and 
undesignated fimbriated fallopian tube segments (3.8 cm in length x 0.8 cm in 
diameter and 3.6 cm in length x 0.9 cm in diameter).  One fallopian tube 
segment is arbitrarily inked blue.  Both 
fallopian tube segments are brown-tan and sectioned reveal a grossly 
unremarkable cut surface.  The fimbriae are entirely submitted. 
The uterine serosa is pink-tan and smooth to anteriorly hemorrhagic and 
disrupted.  The anterior aspect of the uterus and cervix are inked blue.  The 
specimen is opened to reveal a pink-tan to 
 
hemorrhagic endocervix (endocervical canal: 3.0 cm in length x 1.2 cm in 
 
                                                                                    
_________________________________________________________________________________________
PATIENT NAME:     JONN CHAPPELL                     
MEDICAL RECORD #: [id number]            PATHOLOGY                     
          ACCT #: [id number]       ACCESSION #: [id number]     
DATE OF BIRTH:   [birth year]            REPORT #: [id number]       
PHYSICIAN:        PATEL PATHOLOGY              
PCP:              GILMAR EL MD           
REPORT IS CONFIDENTIAL AND NOT TO BE RELEASED WITHOUT AUTHORIZATION
 
 
                                  [facility]
                                    [address] [place], [place]  [address]
_________________________________________________________________________________________
                                                                 Signed   
 
 
diameter) with multiple mucoid material-filled cysts within the wall the cervix 
(ranging in size from 0.2-0.7 cm in greatest 
dimension), and endometrial cavity (4.5 cm superior to inferior x 1.7 cm cornu 
to cornu) with roughened wall and scant identifiable endometrial lining that 
measures 0.1 cm in thickness.  The 
myometrium is pink-tan and trabeculated with no masses.
Representative sections are submitted as follows:
(A1-A2)  Fallopian tubes
(A3)    Cervix
(A4)         Endomyometrium
AC (under the direct supervision of a pathologist)
The Gross Description was prepared using a voice recognition system. The report 
was reviewed for accuracy; however, sound-alike word errors, addition and/or 
deletions may occur. If there is any 
question about this report, please contact Client Services.
 
PERFORMING LABORATORY:
The technical component was performed by [employer], [address] [place] [id number] (Medical Director: Bonnie Bryan MD; CLIA# [id number]). 
[facility], [address] [address] (IA# [id number]). 
 
Diagnostician:  Carina He MD
Pathologist
Electronically Signed 06/11/2021
 
 
Copies:                                
~
 
 
 
 
 
 
 
 
 
 
 
 
 
                                                                                    
_________________________________________________________________________________________
PATIENT NAME:     JONN CHAPPELL ROSE                     
MEDICAL RECORD #: [id number]            PATHOLOGY                     
          ACCT #: [id number]       ACCESSION #: [id number]     
DATE OF BIRTH:   [birth year]            REPORT #: [id number]       
PHYSICIAN:        PATEL PATHOLOGY              
PCP:              GILMAR EL MD           
REPORT IS CONFIDENTIAL AND NOT TO BE RELEASED WITHOUT AUTHORIZATION

## 2021-06-13 NOTE — XMS
PreManage Notification: JONN CHAPPELL MRN:[id number]
 
Security Information
 
Security Events
No recent Security Events currently on file
 
 
 
CRITERIA MET
------------
- [facility]
- [facility] - 2 Visits in 30 Days
 
 
CARE PROVIDERS
-------------------------------------------------------------------------------------
GILMAR EL      Family Gaye     Current
 
PHONE: [phone]
-------------------------------------------------------------------------------------
GILMAR EL     Dentist: General Practice     10/14/2019-Current
 
PHONE: [phone]
-------------------------------------------------------------------------------------
JEAN CISSE      [facility]     07/02/2019-Current
 
PHONE: [phone]
-------------------------------------------------------------------------------------
 
Dustin has no Care Guidelines for this patient.
Care History
Medical/Surgical
07/02/2019    [facility]
 
      - Patient is currently established with [facility]. If patient is seen 
      in the ED during business hours. Please contact CHWs at [facility].
      Care Recommendation: This patient has had 5 or more Emergency Department
      visits in the last 12 months.\T\nbsp; Patient requires education on the scope
      and purpose of the ED as an acute care provider not a Primary Care Provider
      and should not be utilized for chronic conditions.\T\nbsp; These are
      guidelines and the provider should exercise clinical judgment when providing
      care.
E.D. VISIT COUNT (12 MO.)
-------------------------------------------------------------------------------------
2 [facility]
-------------------------------------------------------------------------------------
TOTAL 2
-------------------------------------------------------------------------------------
NOTE: Visits indicate total known visits.
 
ED/UCC VISIT TRACKING (12 MO.)
-------------------------------------------------------------------------------------
06/13/2021 20:47
[facility] [place]
 
TYPE: Emergency
 
COMPLAINT:
 
- POST OP PROBLEM, R SIDED ABD PAIN
-------------------------------------------------------------------------------------
05/25/2021 15:07
[facility] [place]
 
TYPE: Emergency
 
COMPLAINT:
- POSS BLOOD CLOTS
 
DIAGNOSES:
- Other long term (current) drug therapy
- Shortness of breath
- Unspecified asthma, uncomplicated
- Viral infection, unspecified
-------------------------------------------------------------------------------------
 
 
INPATIENT VISIT TRACKING (12 MO.)
No inpatient visits to display in this time frame
 
[email or web address]

## 2021-06-18 NOTE — NUR
awake, no further c/o pain/ Up to br, voided, back to bed, tolerated well, no
c/o lightheadness, coop with assessment, on room air, IVF infusing.

## 2021-06-18 NOTE — NUR
[facility] PT IS A DIRECT ADMIT FROM DR. ORTIZ FOR
ONGING PYLONEPHRYTIS. PT WILL RECEIVE IV FLUIDS AND ABX. PT IND IN ROOM.

## 2021-06-18 NOTE — NUR
NEW DIRECT ADMIT AT 1530 TODAY FROM DR. ORTIZ FOR ONGOING PYLONEPHRYTIS. PT IS
RECEVING IV FLUIDS AND ABX. PRN PAIN MEDS ORDERED. PT IND IN ROOM NO OTHER
COMPLAINTS OR ISSUES.

## 2021-06-19 NOTE — NUR
PT SITTING UP IN BED SAFELY EATING BREAKFAST, CALL LIGHT IN REACH. PT C/O
NAUSEA AND FLANK PAIN, PRN PAIN AND NAUSEA MEDS GIVEN PER REQUEST/PROVIDER
ORDER. NO OTHER NEEDS AT THIS TIME.

## 2021-06-19 NOTE — NUR
pt c/o bilat flank pain, medicated with motrin and percocet. tolerating fluids
well. ivf infusing,.  no c/o adverse reaction to abx., no emesis

## 2021-06-19 NOTE — NUR
PT RESTING SAFELY IN BED W/ CALL LIGHT IN REACH. PT STATES PAIN IS MANAGABLE
AFTER PRN PAIN MEDS. NO NEEDS AT THIS TIME.

## 2021-06-19 NOTE — NUR
Dr Gonzalez in surgery, message left with Diya MADRID RN r/t pts low bp.
pt asymptomatic. denies lightheadness. will hold pain med until MD notified.
Pt aware

## 2021-06-19 NOTE — NUR
PT RESTED IN BED THROUGHOUT SHIFT, IV ABX INFUSING PER ORDER, PT IS
HYPOTENSIVE BUT NOT SYMPTOMATIC AND PROVIDER AWARE. PRN MEDS FOR PAIN
AND NAUSEA ADMINISTERED PER REQUEST/ PROVIDER ORDER. ADEQUATE NUTRITIONAL AND
FLUID INTAKE AS WELL AS URINE OUTPUT.

## 2021-06-19 NOTE — NUR
Pt has slept off and on this shift. On room air, continues to c/o bilat back
flank pain, medicated with percocet and motrin with fair pain relief. c/o
feeling nauseated at begining of shift, medicated with zofran, effective. no
emesis. No c/o adverse reaction to IV abx. IVf infusing w/o problems. Up to br
with assist, voiding QS. uses call light

## 2021-06-19 NOTE — NUR
coop with assessment, on room air, exp wheexing L upper lung, no sob.
independent in room, IVF infusaing, no c/o adverse reaction to abx. medicated
with percocet per  bilat flank paina nd lower abd. lap sites abd healing,
pink, scant amount of serous drainage umbilical area lap site. 2x2 in place.
tolerating fluids well. legs elevatged to her comofrt. coop with assessment

## 2021-06-19 NOTE — NUR
REPORT RECIEVED FROM RN DAYANA, PT RESTING IN BED SAFELY W/ EYES CLOSED
RESPIRATIONS EVEN AND UNLABORED, CALL LIGHT IN REACH.

## 2021-06-19 NOTE — NUR
medicated with zofran 4mg IV c/o dry heaving, no c/o pain at this time/ ivf
infusing, repositions self in bed

## 2021-06-20 NOTE — NUR
PT RESTING IN BED SAFELY W/ CALL LIGHT IN REACH. PT EATING BREAKFAST, C/O PAIN
IN L/R FLANK AND LOWER ABD, PT RECENTLY GIVEN PRN MOTRIN, BUT MAY HAVE PRN
PAIN MEDS THAT ARE STRONGER IF NEEDED. PT IS CHRONICALLY BRADYCARDIC AND
HYPOTENSIVE BUT IS NOT SYMPTOMATIC AND PROVIDER IS AWARE. WILL CONTINUE TO
MONITOR FOR ANY ACUTE CHANGES.

## 2021-06-20 NOTE — NUR
PT HERE FOR RIGHT FLANK PAIN POST TLH ON 6/10. PT INDEPENDANT IN ROOM AND
STEADY ON FEET. PT TOELRATING REGULAR DIET WITH GOOD INTAKE. PT ALERT AND
ORIENTED. HEART TONS REGULAR ALTHROUGH BRADYCARDIA AT TIMES (MD AWARE).
HYPOTENTION ALSO NOTED AT TIMES (MD AWARE) PT ASYMPTOMATIC. PAIN CONTROL
IMPROVING THIS SHIFT. PRN PAIN MEDICATIONS GIVEN FOR 2-8/10 PAIN (SEE MAR). PT
AMBULATING AND UP TO CHAIR THIS AFTERNOON. AMBULATION ENCOURAGED. INCISIONS
C/D/I WITH EDGES WELL APROXIMATED, MILD ERYTHEMA NOTED AROUND EDGES OF RIGHT
ABDOMINAL INCISION AND UMBILICAL INCISION. pT REPORTS FEELING A LUMP UNDER HER
UMBILICAL AREA AS WELL AS A SMALL AMOUNT OF VAGINAL DRAINAGE THIS EVENING. MD
AWARE. PT VOIDING QUANTITY SUFFICIENT. PT USES CALL LIGHT AND MAKES NEEDS
KNOWN.

## 2021-06-20 NOTE — NUR
THIS RN TO ROOM TO CHECK ON PT. PT UP TO CHAIR. PT REPORTS PAIN HAS IMPROVED
NOW AT 2/10. PT DENIES REQUESTS OR COMPLAINTS. CALL LIGHT WITHIN REACH.

## 2021-06-20 NOTE — NUR
REPORT RECEIVED FROM AGNES ANNE. PT RESTING IN BED. PT REPORTS 9/10 PAIN IN
ABDOMEN. SEE MAR FOR MEDICATION GIVEN. PT DENIES ADDITIONAL REQUESTS OR
COMPLAINTS. CALL LIGHT WITHIN REACH. BED RAILS UP.

## 2021-06-20 NOTE — NUR
DR. ORTIZ TO BEDSIDE FOR ROUNDS. DR. ORTIZ UPDATED ON PTS STATUS AND COMPLAINTS
OF PAIN. NEW ORDERS GIVEN FOR VISTERIL, REPEAT BACK TO CONFIRM, ORDERS PLACED.
MD VISITING WITH PT. NO ADDITIONAL REQUESTS OR COMPLAINTS AT THIS TIME.

## 2021-06-20 NOTE — NUR
PT RESTING IN BED SAFELY W/ CALL LIGHT IN REACH. PT STATES PAIN IS TOLERABLE
AFTER PRN PAIN MEDS. DVT PROPHYLAXIS ADMINISTERED PER ORDER. PT DENIES
ANYOTHER NEEDS AT THIS TIME, WILL CONTINUE TO MONITOR.

## 2021-06-20 NOTE — NUR
AFTERNOON ASSESSMENT AND MEDICATION DUE. THIS RN TO ROOM. PT UP TO CHAIR,
FINISHED WITH LUNCH. PT REPORTS MODERATE APPITITE, ABLE TO EAT ~50% OF LUNCH.
ASSESSMENT DONE. INCISIONS C/D/I WITH EDGES WELL APROXIMATED. MILD ERYTHEMA
NOTED AROUND RIGHT LATERAL INCISION AND UMBILICAL INCISION. LEFT LATERAL
INCISION WNL WITH SMALL SCAB NOTED. LUNG SOUNDS CLEAR. HEART TONES REGULAR. NO
EDEMA NOTED. MEDICATIONS GIVEN. IV ASSESSED, WNL, ABX INFUSING. PT DENIES
ADDITIONAL REQUESTS OR COMPLAINTS. CALL LIGHT WITHIN REACH. PT REMAINS UP TO
CHAIR.

## 2021-06-20 NOTE — NUR
iv abx infusing tolerting well, c/o abd pain and upset stomach, medicated with
dilaudid 0.5mg and Zofran 4mg IV. coop with asessment, onroom air. repositions
self in bed

## 2021-06-20 NOTE — NUR
REPORT RECIEVED FROM RN DAYANA, PT RESTING IN BED SAFELY W/ CALL LIGHT IN
REACH. PT C/O PAIN IN FLANK AND LOWER ABD, PT REQUESTING PRN PAIN MEDS.

## 2021-06-20 NOTE — NUR
THIS RN TO ROOM TO CHECK ON PT. PT REPORTS 4/10 PAIN IN RIGHT ABDOMEN STATING
"IT'S MORE IN MY BELLY BUTTON NOW." SEE MAR FOR MEDICATION GIVEN. PT WALKING
AROUND ROOM. PT REPORT SHE FEELS A LUMP UNDER UMBILICAL INCISION SITE. SMALL
LUMP FELT BY THIS RN. PT ALSO REPORTS SHE IS HAVING "A SMALL AMOUNT OF
BLOODY VAGINAL DISCHARGE." PT REPORTS "IT'S ONLY A LITTLE BIT WHEN I WIPE."
PT DECLINES A PER PAD. INCISIONS WNL, EDGES WELL APROXIMATED, MILD ERYTHEMA
CONTINUES AROUND RLQ AND UMBILICAL INCISIONS. PT UP TO CHAIR TO EAT DINNER. NO
ADDITIONAL REQUESTS OR COMPLAINTS. CALL LIGHT WITHIN REACH. BED RAILS UP.

## 2021-06-20 NOTE — NUR
THIS RN TO ROOM TO CHECK ON PT. PT REPORTS PAIN IN RIGHT ABDOMEN AT 4/10. PT
DECLINES PAIN MEDICAITONS AT THIS TIME STATING "MAYBE AFTER MY SHOWER." PT
RECENTLY UP TO AMBULATE IN [place] X1 LAP, INDEPENDANT WITH AMBULATION, TOLERAT
WELL. IV ABX COMPLETE. SALINE LOCKED AT THIS TIME AND COVERED FOR SHOWER. PT
UP TO SHOWER, INDEPENDANT, STEADY ON FEET. CALL LIGHT WITHIN REACH. NO
ADDITIONAL REQUESTS OR COMPLAINTS.

## 2021-06-20 NOTE — NUR
Pt has slept this shift. Has been medicated with Percocet, Dilaudid, Motrin
per back, bilat flank and low abd pain. with good pain relief. no c/o adverse
reaction to IV abx. IVF to run concurrent with abx. otherwise SL. SL at this
time. independent in room, voiding QS, medium yellow colored urine. On room
air.

## 2021-06-20 NOTE — NUR
awakes easily, no distress, c/o R/L abd flank and lower abd pain 6/10,
medicated with Dilaudid 0.5mg her requests, and zofran 4mg IV. per upset
stomach, IVF infsuing, no c/o adverser eaction to abx. tolerating liuids and
jello well, no emesis, repositions self in bed

## 2021-06-20 NOTE — NUR
THIS RN TO ROOM TO CHECK ON PT. PT READY FOR LUNCH. PT UP TO CHAIR, STAND BY
ASSIST. POSITIONED WITH PILLOWS. LINENS CHANGED. PT REPORTS 3/10 PAIN IN RIGHT
SIDE, FLANK AND ABODMEN, DENIES NEED FOR PAIN MEDICAITON AT THIS TIME. PT
DENIES ADDITIONAL REQUESTS OR COMPLAINTS. BELONGINGS WITH IN REACH. CALL LIGHT
WITHIN REACH.

## 2021-06-20 NOTE — NUR
DR ORTIZ CALLED AND UPDATED WITH PTS NEW COMPLAINTS OF LUMB UNDER UMBILICAL
AREA AND VAGINAL DRAINAGE.  STATES THESE ARE BOTH CONSIDERED NORMAL AT THIS
TIME. PT UPDATED. NO ADDITIONAL REQUESTS OR COMPLAINTS. CALL LIGHT WITHIN
REACH.

## 2021-06-20 NOTE — NUR
THIS RN TO ROOM TO ASSIST WITH IV START. 22G STARTED IN LEFT HAND PER PROTCOL.
BRISK BLOOD RETURN NOTED. IV ABX STARTED. IV TO LEFT AC DC'D PER PROTOCOL.
GAUZE AND COBAN APPLIED. PT RESTING IN BED. NO ADDITIONAL REQUESTS OR
COMPLAINTS. CALL LIGHT WIHTIN REACH. BED RAILS UP. PTS RN UPDATED.

## 2021-06-21 NOTE — NUR
Slept, on room air, was medicated per abd pain 2x, effective, per nausea x1,
effective. independent in room, tolerating diet and fluids well, no emesis.
abd old lap sites healing well. no c/o adverse reaction to IV abx. cooperative
alert, oriented, pleasant. voiding qs

## 2021-06-21 NOTE — NUR
Dr. Gonzalez here with pt. discharge today to home, all questions answered by dr. salazar will work on papers and orders.

## 2021-06-21 NOTE — NUR
AWAKES EASILY, MEDICATED WITH VISTARIL, CLL LIGHT AND FLUIDS AT BEDSIDE. IV
ABX INFUSING W/O PROBLEMS

## 2021-06-21 NOTE — NUR
INTO PATIENT ROOM, PATIENT SLEEPING ON SIDE. PATIENT DOES NOT AWAKEN TO VOICE.
WILL RETURN AT LATER TIME TO COMPLETE ASSESSMENT.

## 2021-06-21 NOTE — NUR
bedside report from Zoe ALARCON. pt awake, call light in reach - denies needs.
pain wnl - denies needs.

## 2021-06-21 NOTE — NUR
PT DC INST. GIVEN, VERBAL AND WRITTEN, CONFIRMED [facility] FOR DR. ORTIZ. PT HAD DC INST. REVIEW WITH PHARMACY FATEMEH.  DENIES NEEDS, IV DC - EATING
LUNCH - WILL CALL FOR RIDE TO FRONT WHEN RIDE GETS HERE. DECLINES SHOT AT
NOON.

## 2021-06-29 NOTE — OR
[facility]
                                    [address] [place], [place]  [address]
_________________________________________________________________________________________
                                                                 Signed   
 
 
DATE OF OPERATION:
06/10/2021
 
SURGEON:
Drake Cadena MD
 
PREOPERATIVE DIAGNOSIS:
Abnormal uterine bleeding.
 
POSTOPERATIVE DIAGNOSES:
1. Abnormal uterine bleeding.
2. Severe midline pelvic adhesions in the anterior cul-de-sac.
 
PROCEDURES:
1. Total laparoscopic hysterectomy with bilateral salpingectomy.
2. Lysis of adhesions.
3. Cystoscopy.
 
ASSISTANT:
Elaine Fuentes DO
 
ANESTHESIA:
General.
 
ESTIMATED BLOOD LOSS:
100 mL.
 
COMPLICATIONS:
None.
 
DRAINS:
Parra to bladder.
 
FINDINGS:
Normal vagina.  Normal cervix.  Normal-sized uterus with a scarred endometrial cavity
from previous endometrial ablation.  The anterior cul-de-sac was completely obliterated
from broad dense adhesions approximately midway covering the lower uterine segment.  The
posterior cul-de-sac was free of any endometriosis or adhesions.  The left tube was
normal in length with normal-appearing fimbriated end.  Evidence of previous tubal
ligation in the midportion of the tube, no adhesions.  The left ovary is normal in size
and shape without any evidence of endometriosis or adhesions.  The right tube was normal
in length and normal-appearing fimbriated end and evidence of previous tubal ligation in
 
    Electronically Signed By: DRAKE CADENA MD  06/29/21 1506
_________________________________________________________________________________________
PATIENT NAME:     JONN CHAPPELL                     
MEDICAL RECORD #: [id number]            OPERATIVE REPORT              
          ACCT #: [id number]  
DATE OF BIRTH:   [birth year]            REPORT #: [id number]      
PHYSICIAN:        DRAKE CADENA MD      
PCP:              GILMAR AUSTIN MD           
REPORT IS CONFIDENTIAL AND NOT TO BE RELEASED WITHOUT AUTHORIZATION
 
 
                                  [facility]
                                    [address], [place]  [address]
_________________________________________________________________________________________
                                                                 Signed   
 
 
the midportion of the tube.  There were no adhesions.  The right ovary is normal size
and shape without any evidence of endometriosis or adhesions. 
 
DESCRIPTION OF PROCEDURE:
The patient was brought to the operating room and placed in supine position.  After
adequate general anesthesia was obtained, she was placed in a dorsal lithotomy position,
prepped and draped in a sterile fashion.  Parra catheter was placed in the bladder.
Weighted speculum was placed in the vagina and the anterior lip of cervix grasped with
an Allis clamp.  The uterine cavity was sounded to #7 dilator and then the VCare uterine
manipulator was placed in the uterine cavity because of the previous ablation, this
would only go and approximately 6 cm.  The balloon was filled with water and then the
cervical cap slid up around the cervix.  The Allis clamp and weighted speculum were
removed and the vaginal cuff slid up against the cervical cap and tightened in place to
hold the cervical cap against the cervix.  Attention was then drawn to the abdomen. 
 
A small infraumbilical skin incision was made with a scalpel after injecting the area
with 0.25% Marcaine with epinephrine.  Subcutaneous tissue was dissected with Metzenbaum
scissors and the fascia grasped with hemostats, elevated, nicked with Metzenbaum
scissors and extended in transverse fashion using Metzenbaum scissors.  Retention stitch
of 0-Vicryl suture placed above and below the incision.  The abdominal musculature and
peritoneum in the midline were opened with blunt finger dissection.  An S retractor
inserted into the incision.  The Alondra cannula and sleeve entered the abdomen under
direct visualization.  The balloon was filled with air and then the retention stitches
were attached to the outer sleeve of the Alondra cannula.  The trocar was removed and a
10 mm laparoscope with video attachment entered the abdomen under direct visualization.
Carbon dioxide was used as distending medium.  The above findings were noted.  On the
left side, at least 10 cm lateral to the midline and just below the level of the
umbilicus, the abdominal wall was transilluminated, although because of the thickness of
the abdominal wall, no vessels could be seen.  The area was injected with 0.25% Marcaine
with epinephrine and a small skin incision made with a scalpel.  A bladed 5-mm trocar
and sleeve entered the abdomen under direct visualization.  The balloon of the sleeve
filled up, the trocar was removed and blunt grasper inserted.  On the right side, again
attempt was made to transilluminate the abdominal wall and the skin incision was made
below the level of the umbilicus approximately 10 cm lateral to the midline.  The
incision was made after injecting area with 0.25% Marcaine with epinephrine.  Veress
needle with expandable sleeve placed in the abdomen under direct visualization.  Veress
needle was removed and expandable trocar with a 12 mm sleeve entered the abdomen under
direct visualization, the trocar was removed and second blunt grasper inserted.  The
above findings were confirmed.  The LigaSure Maryland bipolar cautery forceps were used
for dissection.  Two fallopian tubes were cauterized along the mesosalpinx and then
across the proximal portion of the tube on each side and these were removed through the
lateral port.  The adhesions across the uterus were examined, it was decided to proceed
 
    Electronically Signed By: DRAKE CADENA MD  06/29/21 1506
_________________________________________________________________________________________
PATIENT NAME:     JONN CHAPPELL                     
MEDICAL RECORD #: [id number]            OPERATIVE REPORT              
          ACCT #: [id number]  
DATE OF BIRTH:   [birth year]            REPORT #: [id number]      
PHYSICIAN:        DRAKE CADENA MD      
PCP:              GILMAR AUSTIN MD           
REPORT IS CONFIDENTIAL AND NOT TO BE RELEASED WITHOUT AUTHORIZATION
 
 
                                  [facility]
                                    [address], [place]  [address]
_________________________________________________________________________________________
                                                                 Signed   
 
 
with the case laparoscopically.  So, the left utero-ovarian ligament was cauterized in
several places and cut using Maryland forceps.  The posterior aspect of the broad
ligament was taken down to the level of the cup and then around to the cervical cap and
then around to the midline.  The anterior peritoneum was taken down the broad ligament
until reaching the adhesions.  A few of the uterine vessels on this side could be
identified and they were cauterized in several places and cut.  On the right side, the
utero-ovarian ligament was cauterized in several places and cut.  The round ligament
cauterized and cut and the posterior leaf of the broad ligament cauterized and cut down
the broad ligament to the level of the cup and then to the midline connecting with
previous dissection anteriorly.  Again, the anterior leaf of the broad ligament was
taken down until the dense adhesions were identified.  The uterine vessels on this side
could be identified were cauterized in several places and cut just inside the cervical
cap.  At this point, the bladder was filled with sterile formula to identify the extent
of the bladder on the uterus.  The bladder came quite close to the uterus, so Dr. Castellanos, urologist, was called intraoperatively for discussion to decide on dissection
and with the bladder full, there did appear to be a short area of adhesions against the
lower segment of the uterus so Bipolar Maryland forceps were carefully placed against
the anterior uterus and slowly, carefully cauterized and cut the adhesions against the
uterus from the right to the left back to the previous dissection.  With this taken
down, the adhesions under this could be seen, but appeared to not be involving the
bladder, so these were again cauterized against the uterus and cut.  This was taken down
until the cervical cap could be palpated and the rest of the tissue inside the cervical
cap anteriorly cauterized and cut, so that the rest of the uterine vessels could be
identified on each side.  Uterine vessels on each side were then cauterized in several
places and cut just inside the cervical cap.  The perivaginal tissue was cauterized and
cut down to the level of just at the vaginal mucosa and then the Sonicision instrument
was brought in and the vaginal mucosa opened posteriorly in the groove of the cap and
the vaginal mucosa cut in the groove from posterior to anterior on the right side and
then posterior to anterior on the left side.  This  the uterus and cervix from
the rest of the vagina. 
 
The attention drawn back to the pelvis, where the VCare manipulator was removed.  The
cervix was seen in the vagina, so the cervix was grasped with the Allis clamp and
carefully removed through the vagina and passed off the table.  A sponge filled glove
was placed in the vagina and the abdomen re-insufflated. 
 
The entire pelvis was irrigated, suctioned.  There was small amount of bleeding at both
angles and this was cauterized with the Maryland bipolar forceps and with the Bovie,
care was taken to avoid going lateral to avoid injury to the ureters.  When good
hemostasis was obtained, the vaginal cuff was closed using the EndoStitch with barbed
suture.  Thus, closure was done from the right uterosacral ligament, posterior to
anterior and the first stitch passing through the loop at the end of the suture and
 
    Electronically Signed By: DRAKE CADENA MD  06/29/21 1506
_________________________________________________________________________________________
PATIENT NAME:     JONN CHAPPELL                     
MEDICAL RECORD #: [id number]            OPERATIVE REPORT              
          ACCT #: [id number]  
DATE OF BIRTH:   [birth year]            REPORT #: [id number]      
PHYSICIAN:        DRAKE CADENA MD      
PCP:              GILMAR AUSTIN MD           
REPORT IS CONFIDENTIAL AND NOT TO BE RELEASED WITHOUT AUTHORIZATION
 
 
                                  [address]  [address]
_________________________________________________________________________________________
                                                                 Signed   
 
 
individually grasping and placing the needle through the posterior edge of the vagina
and then anterior edge of the vagina and then back posterior.  The vagina was easily
closed from the right to the left.  Upon reaching the left uterosacral ligament, the
stitch was then taken back towards the midline in the combined cuff and the suture cut
against the vaginal wall.  The entire pelvis was again irrigated, suctioned, and
examined, noted to have good hemostasis.  Because of the extensive dissection, the
entire pelvic dissection was sprayed with Tisseel to help with further hemostasis.  At
this point, all instruments were removed, the gas allowed to escape, and the final
sleeve removed.  The infraumbilical fascial incision was closed using a running stitch
of 0-Vicryl suture.  The two retention stitches were tied together for further support.
The three skin incisions were closed using subcuticular stitches of 4-0 Vicryl suture. 
 
Cystoscopy was then done.  Parra catheter was removed and a 70-degree cystoscope with
sleeve was placed in the urethral opening and entered through the urethra into the
bladder under direct visualization using sterile water as distending medium.  The entire
dome of the bladder on both sides were all carefully examined, and no defects, no
sutures, and no irregularities were noted.  Both ureteral orifices were identified and
both showed good flow of urine out of the ureters.  The cystoscope was then removed, the
bladder drained and the sleeve removed the cuff.  The sponge filled glove was removed
and the Parra catheter placed back in the bladder.  The patient tolerated the procedure
well and went to the recovery room in good condition.  The sponge, needle, and
instrument 
count correct at the end of the procedure.  The uterus and both fallopian tubes sent to
Pathology for identification. 
 
 
 
            ________________________________________
            MD TROY Delvalle/LYNN
Job #:  [id number]
DD:  06/10/2021 11:12:15
DT:  06/10/2021 17:27:54
 
cc:            Gilmar Austin MD
 
 
Copies:  GILMAR AUSTIN DMD
 
 
 
    Electronically Signed By: DRAKE CADENA MD  06/29/21 1506
_________________________________________________________________________________________
PATIENT NAME:     JONN CHAPPELL ROSE                     
MEDICAL RECORD #: [id number]            OPERATIVE REPORT              
          ACCT #: [id number]  
DATE OF BIRTH:   [birth year]            REPORT #: [id number]      
PHYSICIAN:        DRAKE CADENA MD      
PCP:              GILMAR AUSTIN MD           
REPORT IS CONFIDENTIAL AND NOT TO BE RELEASED WITHOUT AUTHORIZATION
 
 
                                  [facility]
                                    [address] [place] [address]  [address]
_________________________________________________________________________________________
                                                                 Signed   
 
 
~
 
 
 
 
 
 
 
 
 
 
 
 
 
 
 
 
 
 
 
 
 
 
 
 
 
 
 
 
 
 
 
 
 
 
 
 
 
 
 
 
 
 
    Electronically Signed By: DRAKE CADENA MD  06/29/21 1506
_________________________________________________________________________________________
PATIENT NAME:     JONN CHAPPELL                     
MEDICAL RECORD #: [id number]            OPERATIVE REPORT              
          ACCT #: [id number]  
DATE OF BIRTH:   [birth year]            REPORT #: [id number]      
PHYSICIAN:        DRAKE CADENA MD      
PCP:              GILMAR AUSTIN MD           
REPORT IS CONFIDENTIAL AND NOT TO BE RELEASED WITHOUT AUTHORIZATION

## 2021-07-16 NOTE — HP
[facility]
                                    [address], [place]  [address]
_________________________________________________________________________________________
                                                                 Signed   
 
 
ADMISSION DATE:  
2021
 
CHIEF COMPLAINT:  
Right flank pain.
 
HISTORY OF PRESENT ILLNESS:  
Ms. Aiken is a very pleasant 40-year-old G7, P6, white female, status post total
laparoscopic hysterectomy and bilateral salpingectomy, cystoscopy and lysis of adhesions
on 06/10/2021 by Dr. Cadena, who presented to the office on 06/15/2021, complaining of
incisional right-sided pain that radiates to the back with low-grade fevers on and off.
She was seen in the emergency department on  with normal labs and CT and was
discharged home with pain medications.  She reports that her symptoms have continued to
worsen.  A urine culture was performed on 06/15 and patient was treated empirically with
Macrobid.  Two days later, the patient was re-evaluated and reports that her flank pain
had increased despite Macrobid and she had T-max of 102 and low-grade fevers 99.7.  She
was given a course of ceftriaxone and Macrobid was changed to ciprofloxacin.  The
patient declined inpatient management of presumed pyelonephritis with close followup
plan.  Today, the patient reports continued worsening of her right flank pain with new
left flank pain.  She has had on and off mild fevers with T-max of 99.7.  Decision was
made to admit the patient for inpatient management of pyelonephritis and management of
her pain. 
 
PAST MEDICAL HISTORY:  
1. Hypothyroid, status post total thyroidectomy.
2. Eosinophilic esophagitis.
3. Raynaud's.
 
PAST SURGICAL HISTORY:  
1. Tonsillectomy.
2. Ankle surgery x4,  x4, with bladder laceration repair in  during
. 
3. Tubal ligation in .
4. Endometrial ablation.
5. Total thyroidectomy.
6. Cholecystectomy.
7. Total laparoscopic hysterectomy cystoscopy and lysis of adhesion in 06/10/2021.
 
MEDICATIONS:  
1. Levothyroxine 200 mcg daily.
2. Percocet.
3. Ibuprofen.
 
    Electronically Signed By: SANTOS GONZALEZ DO  21 2249
_________________________________________________________________________________________
PATIENT NAME:     JONN AIKEN                     
MEDICAL RECORD #: [id number]            HISTORY AND PHYSICAL          
          ACCT #: [id number]  
DATE OF BIRTH:   [birth year]            REPORT #: [id number]      
PHYSICIAN:        SANTOS GONZALEZ DO               
PCP:              GILMAR EL MD           
REPORT IS CONFIDENTIAL AND NOT TO BE RELEASED WITHOUT AUTHORIZATION
 
 
                                  [facility]
                                    [address] [place], [place]  [address]
_________________________________________________________________________________________
                                                                 Signed   
 
 
4. Antibiotics per HPI.
 
ALLERGIES:  
No known drug allergies.
 
FAMILY HISTORY:  
Father alive.  Mother alive with granulocytic and acute myelocytic leukemia with bone
marrow transplant, breast cancer, skin cancer, [employer]. Maternal grandmother with
breast cancer and paternal grandmother with colon cancer. 
 
SOCIAL HISTORY:  
Former smoker.  The patient denies alcohol or recreational drugs.  She is a compliance
 at [employer]. 
 
REVIEW OF SYSTEMS:  
A complete review of systems was performed and negative except per HPI.
 
PHYSICAL EXAMINATION:
VITAL SIGNS:  Weight 254, temperature 97.6, blood pressure 98/60, pulse 88, SaO2 98%. 
GENERAL:  Pleasant, cooperative with good eye contact. 
HEENT:  Normocephalic, atraumatic.   
NECK:  Supple.  No masses.  Trachea midline. 
HEART:  Regular rate and rhythm. 
LUNGS:  Clear to auscultation bilaterally. 
ABDOMEN:  Soft, nondistended, nontender with well-healed surgical incisions.  She has
some mild suprapubic tenderness.  Significant flank tenderness to percussion with right
much worse than the left.  No bony pain over the lumbar spine or significant tenderness
to light touch of the paraspinal musculature. 
PELVIC:  Exam was performed yesterday that demonstrates normal clitoris, urethral
meatus, bilateral Peetz's, and Bartholin's.  Normal vulva, perineum, and anus.  Normal
vagina with excellent apical support with cuff intact without evidence of erythema,
edema, infection, or other concerns. 
EXTREMITIES:  No significant edema.
 
ASSESSMENT:  
The patient with pyelonephritis that has failed outpatient management with IM
ceftriaxone and ciprofloxacin. 
 
PLAN:  
Decision was made to admit the patient for parenteral antibiotics.  We will use Zosyn as
cultures have returned Enterobacter cloacae complex with susceptibilities reviewed.  We
will manage the patient's pain with multimodal therapy including acetaminophen,
 
    Electronically Signed By: SANTOS GONZALEZ DO  21 2249
_________________________________________________________________________________________
PATIENT NAME:     JONN AIKEN                     
MEDICAL RECORD #: [id number]            HISTORY AND PHYSICAL          
          ACCT #: [id number]  
DATE OF BIRTH:   [birth year]            REPORT #: [id number]      
PHYSICIAN:        SANTOS GONZALEZ DO               
PCP:              GILMAR EL MD           
REPORT IS CONFIDENTIAL AND NOT TO BE RELEASED WITHOUT AUTHORIZATION
 
 
                                  [facility]
                                    [address], [place]  [address]
_________________________________________________________________________________________
                                                                 Signed   
 
 
ibuprofen, Percocet and Dilaudid.  Anticipate continuing Zosyn q.6 hours until
significant improvement in any fevers and pain.  Should the patient not make significant
improvement, we would repeat CT.  Again, we reviewed CT that was performed in the
emergency department earlier this week that demonstrated no obvious obstruction or renal
abnormalities associated with recent surgery.  We reviewed possible differential
diagnosis in detail as well as anticipated hospital course.  All questions were answered
to the best of my ability with the patient's apparent satisfaction.  Labs were ordered
and pending at the time of this dictation. 
 
 
            ________________________________________
            Santos Gonzalez DO 
 
JDW/MODL
Job #:  [id number]
DD:  2021 14:50:31
DT:  2021 20:31:08
 
 
Copies:                                
~
 
 
 
 
 
 
 
 
 
 
 
 
 
 
 
 
 
 
 
 
 
 
    Electronically Signed By: SANTOS GONZALEZ DO  21 2249
_________________________________________________________________________________________
PATIENT NAME:     JONN AIKEN                     
MEDICAL RECORD #: [id number]            HISTORY AND PHYSICAL          
          ACCT #: [id number]  
DATE OF BIRTH:   [birth year]            REPORT #: [id number]      
PHYSICIAN:        SANTOS GONZALEZ DO               
PCP:              GILMAR EL MD           
REPORT IS CONFIDENTIAL AND NOT TO BE RELEASED WITHOUT AUTHORIZATION

## 2024-10-27 NOTE — EKG
[facility]
                                    [address] [facility] [place] [facility]
                                  [place]  [address]
_________________________________________________________________________________________
                                                                 Signed   
 
 
Normal sinus rhythm
Normal ECG
When compared with ECG of 25-MAY-2021 16:40,
No significant change was found
Confirmed by Franco Escamilla MD ([id number]) on 10/27/2024 8:36:30 PM
 
 
 
 
 
 
 
 
 
 
 
 
 
 
 
 
 
 
 
 
 
 
 
 
 
 
 
 
 
 
 
 
 
 
 
 
 
 
 
 
    Electronically Signed By: FRANCO ESCAMILLA DO  10/27/24 2036
_________________________________________________________________________________________
PATIENT NAME:     JONN CHAPPELL ROSE                     
MEDICAL RECORD #: [id number]                     Electrocardiogram             
          ACCT #: [id number]  
DATE OF BIRTH:   [birth year]                                       
PHYSICIAN:   FRANCO ESCAMILLA DO                     REPORT #: [id number]
REPORT IS CONFIDENTIAL AND NOT TO BE RELEASED WITHOUT AUTHORIZATION